# Patient Record
Sex: FEMALE | Race: OTHER
[De-identification: names, ages, dates, MRNs, and addresses within clinical notes are randomized per-mention and may not be internally consistent; named-entity substitution may affect disease eponyms.]

---

## 2020-03-30 ENCOUNTER — HOSPITAL ENCOUNTER (INPATIENT)
Dept: HOSPITAL 25 - MCHOBOUT | Age: 35
LOS: 5 days | Discharge: HOME | DRG: 540 | End: 2020-04-04
Attending: ADVANCED PRACTICE MIDWIFE | Admitting: ADVANCED PRACTICE MIDWIFE
Payer: MEDICAID

## 2020-03-30 DIAGNOSIS — D64.9: ICD-10-CM

## 2020-03-30 DIAGNOSIS — Z3A.41: ICD-10-CM

## 2020-03-30 DIAGNOSIS — O48.0: Primary | ICD-10-CM

## 2020-03-30 PROCEDURE — 86901 BLOOD TYPING SEROLOGIC RH(D): CPT

## 2020-03-30 PROCEDURE — 86900 BLOOD TYPING SEROLOGIC ABO: CPT

## 2020-03-30 PROCEDURE — 85025 COMPLETE CBC W/AUTO DIFF WBC: CPT

## 2020-03-30 PROCEDURE — 36415 COLL VENOUS BLD VENIPUNCTURE: CPT

## 2020-03-30 PROCEDURE — 80307 DRUG TEST PRSMV CHEM ANLYZR: CPT

## 2020-03-30 PROCEDURE — 86850 RBC ANTIBODY SCREEN: CPT

## 2020-03-30 PROCEDURE — G0480 DRUG TEST DEF 1-7 CLASSES: HCPCS

## 2020-03-30 NOTE — PN
Progress Note





- Progress Note


Date of Service: 03/30/20


Note: 





S: Pt resting comfortably in bed. Denies feeling any strong UCs. Describes 

active FM. Wondering what the next step is





O: /70  HR 76  RR 17  T 97.8


FHT 135bpm. Moderate variability. +Accels. No decels


UCs q 2-5, mild to palpation


VE deferred





A: IUP at 41 weeks here for postdates ripening/induction


No evidence of fetal metabolic acidemia





P: Given that pt just beginning to have UCs and Cat I FHT will continue 

Cervidil until 0138 (16 hours post placement). Pt and FOB agree. Enc rest.

## 2020-03-30 NOTE — HP
General Information





- Reason for Visit





IUP at 41 weeks here for postdates ripening/induction





- General Information


Maternal Age: 34


Grav: 1


Para: 0


SAB: 0


IEA: 0





Estimated Due Date: 20


Determined By: LMP


Gestational Age in Weeks/Days: 41-0/7


Maternal Blood Type and Rh: AB Positive





- Results this Pregnancy


Serology/RPR Result: Non-Reactive


Rubella Result: Immune


HBsAg Result: Negative


HIV Result: Negative


GBS Culture Result: Positive





Past Medical History


Delivery History: See  Records


Delivery History Comment: 





Primip


Pertinent Past Medical History: See  Records


Past Medical History Comment: 





H/O HSV. Has been on prophylactic Valtrex. Denies any outbreaks during pregnancy


Pertinent Past Surgical History: None


Pertinent Family History: See  Records


Family History Comment: 





Father: , colon cancer


PGM: , MVA


MGM: , esophageal cancer





- Antepartal Records


Antepartal Records: Reviewed, Pregnancy Complicated by: - GBS bacteriuria





Review of Systems


Constitutional: Comfortable


CV Complaint: No


Respiratory: Shortness of Breath: No


Gastrointestinal: No Nausea/Vomiting, Normal Bowel Movement


Genitourinary: No Dysuria, No Bleeding, No Leaking Fluid


Musculoskeletal: No Complaint, No Epigastric Pain


Neurological: No Headache, No Visual Changes


Fetal Movement: Normal





Exam


Allergies/Adverse Reactions: 


Allergies





No Known Allergies Allergy (Verified 20 08:35)


 











/51  HR 77  RR 16  T 97.7  SpO2 100% on RA





- Measurements


Height: 5 ft 2.25 in


Weight: 168 lb


Weight in lbs: [id number]


Body Mass Index (BMI): 30.4


Pre-Pregnancy Weight: 147 lb


Weight Gained This Pregnancy: 21 lbs and 0 ozs





- Exam


Breast: Breast Exam Deferred


CVA: No CVA Tenderness


Extremities: No Edema


Heart: Normal Rhythm/Heart Sounds


HEENT: No Significant Findings


Lungs: Clear Bilaterally


Rectal: Rectal Exam Deferred


Reflexes: DTR 2+


Thyroid: No Thyromegaly





- Abdominal Exam


Abdomen Exam: Non-Tender, Fundal Height Consistent with Dates





Targeted Exam Findings


See L&D Outpatient Visit Provider Note for Findings: N/A


Estimated Fetal Weight: EFW 7.5-8lbs by Leopolds


Cervical Exam: Closed


Effacement: Thick


Station: -1


Presenting Part: Vertex


Membrane Status: Intact


Sterile Speculum Exam: Not done


Bleeding/Discharge: None





EFM Findings





- External Fetal Monitor Findings


Baseline Fetal Heart Rate: 135


External Fetal Monitor Findings: Accelerations Present, No Pattern of Variable 

or Late Decelerations, Variability Moderate, Baseline Stable


External Monitor Findings Comment: No evidence of fetal metabolic acidemia


Contractions: Irregular, Mild


Contraction Frequency: q 6-8 min





Assessment/Plan





- Assessment





IUP at 41 weeks here for postdates ripening


Bishops Score: 4, unfavorable for induction


No evidence of fetal metabolic acidemia





- Obstetrical Risk Factors


Obstetrical Risk Factors: GBS Positive, Post-Dates





- Plan


Plan: Cervical Ripening, Admit - Anticipate Vaginal Delivery


Plan Comment: 





PARQ Cervidil. Pt and FOB agree. Monitor per protocol. Remove in 12-16 hours, 

sooner PRN onset active labor, fetal intolerance, or tachysystole





- Date/Time of Admission


Date of Admission: 20


Time of Admission: 08:32

## 2020-03-30 NOTE — PTEDU
Patient Name: BARB BARBER

MRN: [id number]



BARB BARBER selected video: Never Ever Shake a Baby to view on 03/30/2020 at 3:36
:39 PM from [id number]

## 2020-03-31 LAB
BASOPHILS # BLD AUTO: 0 10^3/UL (ref 0–0.2)
EOSINOPHIL # BLD AUTO: 0.1 10^3/UL (ref 0–0.6)
HCT VFR BLD AUTO: 31 % (ref 35–47)
HGB BLD-MCNC: 10.5 G/DL (ref 12–16)
LYMPHOCYTES # BLD AUTO: 1.8 10^3/UL (ref 1–4.8)
MCH RBC QN AUTO: 28 PG (ref 27–31)
MCHC RBC AUTO-ENTMCNC: 34 G/DL (ref 31–36)
MCV RBC AUTO: 82 FL (ref 80–97)
MONOCYTES # BLD AUTO: 0.4 10^3/UL (ref 0–0.8)
NEUTROPHILS # BLD AUTO: 4 10^3/UL (ref 1.5–7.7)
NRBC # BLD AUTO: 0 10^3/UL
NRBC BLD QL AUTO: 0.1
PLATELET # BLD AUTO: 147 10^3/UL (ref 150–450)
RBC # BLD AUTO: 3.8 10^6 /UL (ref 3.7–4.87)
WBC # BLD AUTO: 6.3 10^3/UL (ref 3.5–10.8)

## 2020-03-31 RX ADMIN — SODIUM CHLORIDE, SODIUM LACTATE, POTASSIUM CHLORIDE, AND CALCIUM CHLORIDE ONE MLS/HR: 600; 310; 30; 20 INJECTION, SOLUTION INTRAVENOUS at 08:17

## 2020-03-31 RX ADMIN — SODIUM CHLORIDE, SODIUM LACTATE, POTASSIUM CHLORIDE, AND CALCIUM CHLORIDE ONE: 600; 310; 30; 20 INJECTION, SOLUTION INTRAVENOUS at 08:25

## 2020-03-31 NOTE — PN
Progress Note





- Progress Note


Date of Service: 03/31/20


Note: 





S: Patient in significantly more pain with contractions





O: VE by RN 4cm/80/-1


Spontaneous rupture of membranes to clear fluid, Cook's removed


, minimal yvon, +accels, no decels


Pit @ 14


UCs q 1-3 min





A: IUP @ 41+1 in early labor


Doubt fetal acidemia








P: Plan to turn off pitocin and patient will get in tub for a time to get a 

break. May opt for return to pitocin vs rest vs therapeutic rest after tub.

## 2020-03-31 NOTE — PN
Progress Note





- Progress Note


Date of Service: 03/31/20


Note: 





S: Patient doing well with Cook's balloon, has been filled to 60/60ml. She was 

on birth ball and then bed. Reports she sleep a little bit.





O: VE deferred


, +accels, no decels, mod yvon


UCs q 5-8 min


VSS





A: IUP @ 41+1 weeks gestation for induction


GBS+


No evidence fetal acidemia





P: Discussion of pitocin for augmentation; patients in agreement. Will initiate 

low dose pitocin and increase saline in Cook's balloons to max if tolerated. 

Initiate GBS prophylaxis with active labor pattern.

## 2020-03-31 NOTE — PN
Progress Note





- Progress Note


Date of Service: 03/31/20


Note: 





S: Pt reports increased pain and pressure in the last 30 minutes. Wincing and 

breathing with UCs





O: /70  HR 76  T 98.3  RR 16


FHT 140bpm. Moderate variability. +Accels. No decels


UCs q 1.5-4 min, palpate mild


VE: Cervidil removed 1cm/50%/vtx -1





A: IUP at 41-1/7 s/p cervidil x 1 for postdates ripening/induction


No evidence of fetal metabolic acidemia





P: Will continue to monitor UCs at this time. Discussed trial of medication to 

aid with sleep such as Benadryl. Pt hoping to avoid medicine but will consider. 

PRN order input. Enc rest. If increasing discomfort or inability to sleep could 

try warm tub or shower. Re-eval in AM or sooner PRN

## 2020-03-31 NOTE — PN
Progress Note





- Progress Note


Date of Service: 03/31/20


Note: 





S: Coping well though getting tired. Contractions getting stronger.





O: VE deferred


, + accels, no decels, mod yvon


Pit @ 13


VSS, afebrile


UCs q 2-4 





A: IUP @ 41+1 weeks gestation for induction of labor


No evidence fetal acidemia


Intact membranes





P: Plan to remove balloon catheter @ 12 hour shakeel and assess cervical change. 

Then to decide between continued pitocin vs a rest for some sleep overnight.

## 2020-03-31 NOTE — PN
Progress Note





- Progress Note


Date of Service: 03/31/20


Note: 





S: Pt was able to rest some overnight without Benadryl. Reports in the last 

hour UCs feel stronger and closer together. 





O: /59  HR 75  T 98.4  RR 18


FHT 125bpm. Moderate variability. +Accels. No decels.


UCs q 1-3


VE unchanged since 0130: 1/50%/vtx -1





A: IUP at 41-1/7 in latent labor


No evidence of fetal metabolic acidemia





P: Pt will eat breakfast, take a shower, and agrees to IV Placement. If UC 

pattern continues will likely trial IV pitocin. Report to Christie Ervin CNM 

who will assume care at 0800

## 2020-03-31 NOTE — PN
Progress Note





- Progress Note


Date of Service: 03/31/20


Note: 





S: Patient reports she slept "a few hours" and contractions this morning are 

mild. She declined translation via iPad and states that her  can 

translate for her. 





O: VE 1cm/60/-1


UCs q 1-4 min, some coupling


, +accels, no decels, mod yvon


VSS, afebrile





A: IUP 41+1 week gestation for induction of labor


No evidence fetal acidemia


GBS bacteriuria


Membranes intact





P: PARQ discussion of Cook's catheter for ripening and considering pitocin 

given contraction pattern. Patient in agreement.

## 2020-04-01 PROCEDURE — 3E033VJ INTRODUCTION OF OTHER HORMONE INTO PERIPHERAL VEIN, PERCUTANEOUS APPROACH: ICD-10-PCS | Performed by: OBSTETRICS & GYNECOLOGY

## 2020-04-01 PROCEDURE — 3E0P7VZ INTRODUCTION OF HORMONE INTO FEMALE REPRODUCTIVE, VIA NATURAL OR ARTIFICIAL OPENING: ICD-10-PCS | Performed by: OBSTETRICS & GYNECOLOGY

## 2020-04-01 RX ADMIN — PENICILLIN G POTASSIUM SCH MLS/HR: 20000000 POWDER, FOR SOLUTION INTRAVENOUS at 16:03

## 2020-04-01 RX ADMIN — PENICILLIN G POTASSIUM SCH MLS/HR: 20000000 POWDER, FOR SOLUTION INTRAVENOUS at 11:47

## 2020-04-01 RX ADMIN — PENICILLIN G POTASSIUM SCH MLS/HR: 20000000 POWDER, FOR SOLUTION INTRAVENOUS at 07:48

## 2020-04-01 RX ADMIN — PENICILLIN G POTASSIUM SCH MLS/HR: 20000000 POWDER, FOR SOLUTION INTRAVENOUS at 03:46

## 2020-04-01 RX ADMIN — PENICILLIN G POTASSIUM SCH MLS/HR: 20000000 POWDER, FOR SOLUTION INTRAVENOUS at 20:05

## 2020-04-01 NOTE — PN
Progress Note





- Progress Note


Date of Service: 04/01/20


SOAP: 


Subjective:


Pt remains fairly comfortable with epidural. 





Objective:


Cervix: 9cm/ 100%/0 station


FHR: Baseline 150/ minimal variability/ no accels/ no decels


UCs: 2-3 min apart


BP: 104/49


Temp: 98.6


Fluid clear





Assessment:


Pt with good change. FHR tracing Category II. 





Plan:


Will continue to labor. Position changes, IVF bolus, and decrease Pitocin if 

necessary to try to improve FHR tracing. Recheck cervix in 2 hours or as 

needed. Continue GBS prophylaxis.

## 2020-04-01 NOTE — PN
Progress Note





- Progress Note


Date of Service: 20


SOAP: 


Subjective:


Pt comfortable with epidural.  at bedside. 





Objective:


FHR: Baseline 140/ moderate variability/ no accels/ was having some late decels 

which seem to have resolved with position change


UCs: 2-3 minutes


Pitocin at 14 mu/ min


Cervical exam deferred


Temp: 98.7


BP: 117/75





Assessment:


No evidence of fetal acidemia or chorioamnionitis. Comfortable with epidural. 





Plan:


Will check cervix in 1-2 hours or when pt feeling more pressure. Continue 

Pitocin. Continue GBS prophylaxis. Anticipate .

## 2020-04-01 NOTE — PN
Progress Note





- Progress Note


Date of Service: 04/01/20


SOAP: 


Subjective:


Pt remains comfortable with epidural.


 at bedside.





Objective:


Cervix: 7cm/ 0 station/ 100%/ vtx


FHR: Baseline 140/ moderate variability/ + accels/ no decels


UCs: 2-3 minutes, MVUs 180 


BP: 109/56


Temp: 99.3  





Assessment:


Pt made some cervical change. No evidence of fetal acidemia or 

chorioamnionitis. 





Plan:


Continue Pitocin. Continue GBS prophylaxis. Continue position changes. Recheck 

cervix in 2 hours.

## 2020-04-01 NOTE — PN
Progress Note





- Progress Note


Date of Service: 04/01/20


SOAP: 


Subjective:


Pt was very uncomfortable with ctx, despite epidural. Using bolus button 

occasionally.





Objective:


Cervix: 6 cm/ 100%/ 0 station, fetal head better applied, still molding but no 

caput


UCs: were every 2 minutes prior to reducing Pitocin, now 2-3, 155 MVUs


FHR:Baseline 155 w/minimal variability and recurrent late decels, following 

position change and reduction of Pitocin now moderate variability and no decels


Temp: 99.3  





Assessment:


Baby feels better positioned in the pelvis, but no change in dilation yet. FHR 

tracing Category II but improves to Category I with position changes and 

reduction of Pitocin. 





Plan:


Pitocin reduced by half, will begin to increase again as tolerated according to 

FHR. Anesthesiologist Dr. Rao called to bolus epidural. Will recheck cervix 

in an hour or two or as needed.

## 2020-04-01 NOTE — PN
Progress Note





- Progress Note


Date of Service: 04/01/20


SOAP: 


Subjective:


Pt more uncomfortable with ctx. Encouraged use of bolus button.


 at bedside.





Objective:


Cervix: 6cm/ -1/ 100%/ vtx with molding noted but no caput


FHR: Baseline 145/ moderate variability/ no accels/ no decels


UCs: Q 2 minutes/ MVUs 190


Pitocin at 16 mu/ min


Temp: 99.1


Fluid clear 





Assessment:


Pt has not made cervical change in a number of hours. Last exam by RN at 0639 

was 8cm, so likely no change for at least 4 hours. No evidence of fetal 

acidemia or chorioamnionitis. 





Plan:


IUPC placed. Will titrate Pitocin accordingly. Recommend position frequent 

position changes to encourage fetal descent and optimal positioning. Recheck in 

2 hours.

## 2020-04-01 NOTE — PN
Progress Note





- Progress Note


Date of Service: 20


Note: 


Pt admitted about 50+ hours ago for induction at 41 weeks EGA.


Pt initially had cervical ripening, followed by pitocin.


AROM was about 24 hrs ago.  She received an epidural at about 0130 last night.


During the day today, she progressed from about 6cm to 9cm over about 9 hours, 

and there has been minimal change for about 4 hours.


Pt offered to continue labor vs discontinue and have .


Pt is from [place], seems to understand English quite well.   also present.

  Both declined translation service.


After discussion, pt and  have decided to proceed with a  

delivery.  


We discussed the surgery as well as the risks including bleeding, hemorrhage, 

infection, organ injury, and transfusion.


All questions answered, consent signed.

## 2020-04-02 LAB
BASOPHILS # BLD AUTO: 0 10^3/UL (ref 0–0.2)
EOSINOPHIL # BLD AUTO: 0 10^3/UL (ref 0–0.6)
HCT VFR BLD AUTO: 24 % (ref 35–47)
HGB BLD-MCNC: 8.2 G/DL (ref 12–16)
LYMPHOCYTES # BLD AUTO: 0.7 10^3/UL (ref 1–4.8)
MCH RBC QN AUTO: 28 PG (ref 27–31)
MCHC RBC AUTO-ENTMCNC: 34 G/DL (ref 31–36)
MCV RBC AUTO: 82 FL (ref 80–97)
MONOCYTES # BLD AUTO: 0.3 10^3/UL (ref 0–0.8)
NEUTROPHILS # BLD AUTO: 12.4 10^3/UL (ref 1.5–7.7)
NRBC # BLD AUTO: 0 10^3/UL
NRBC BLD QL AUTO: 0
PLATELET # BLD AUTO: 111 10^3/UL (ref 150–450)
RBC # BLD AUTO: 2.96 10^6 /UL (ref 3.7–4.87)
WBC # BLD AUTO: 13.4 10^3/UL (ref 3.5–10.8)

## 2020-04-02 RX ADMIN — Medication SCH MG: at 20:45

## 2020-04-02 RX ADMIN — DOCUSATE SODIUM SCH MG: 100 CAPSULE, LIQUID FILLED ORAL at 14:18

## 2020-04-02 RX ADMIN — KETOROLAC TROMETHAMINE SCH MG: 30 INJECTION, SOLUTION INTRAMUSCULAR; INTRAVENOUS at 00:20

## 2020-04-02 RX ADMIN — DOCUSATE SODIUM SCH MG: 100 CAPSULE, LIQUID FILLED ORAL at 08:42

## 2020-04-02 RX ADMIN — SIMETHICONE CHEW TAB 80 MG SCH MG: 80 TABLET ORAL at 18:31

## 2020-04-02 RX ADMIN — SIMETHICONE CHEW TAB 80 MG SCH MG: 80 TABLET ORAL at 12:32

## 2020-04-02 RX ADMIN — SIMETHICONE CHEW TAB 80 MG SCH MG: 80 TABLET ORAL at 08:42

## 2020-04-02 RX ADMIN — KETOROLAC TROMETHAMINE SCH MG: 30 INJECTION, SOLUTION INTRAMUSCULAR; INTRAVENOUS at 08:40

## 2020-04-02 RX ADMIN — KETOROLAC TROMETHAMINE SCH MG: 30 INJECTION, SOLUTION INTRAMUSCULAR; INTRAVENOUS at 14:16

## 2020-04-02 RX ADMIN — ACETAMINOPHEN SCH MG: 325 TABLET ORAL at 08:41

## 2020-04-02 RX ADMIN — DOCUSATE SODIUM SCH MG: 100 CAPSULE, LIQUID FILLED ORAL at 20:45

## 2020-04-02 NOTE — OP
DATE OF OPERATION:  Surgery was started on 20 - ROOM #104

 

DATE OF BIRTH:  [birth year]

 

SURGEON:  Lio Palacios MD

 

ASSISTANT:  Christianne Roman CNM

 

ANESTHESIOLOGIST:  Dr. Carito Vazquez.

 

ANESTHESIA:  Epidural.

 

PRE-OP DIAGNOSIS:  41 weeks gestation with arrest of dilation.

 

POST-OP DIAGNOSIS:  41 weeks gestation with arrest of dilation.

 

OPERATIVE PROCEDURE:  Primary low transverse  section.

 

ESTIMATED BLOOD LOSS:  800 cc.

 

URINE OUTPUT:  120 cc.

 

IV FLUIDS:  2500 cc lactated Ringer's.

 

MATERIALS TO LAB:  Cord blood.

 

INDICATIONS:  This patient is a 34-year-old  1, para 0, who was admitted 
2 days ago for induction of labor at 41 weeks gestation.  The patient started 
with cervical ripening and proceeded to Pitocin.  She received an epidural 
about 20 hours earlier.  The patient had slow change from about 6 cm to 9 cm 
throughout the day and had no significant cervical change from 9 cm after 
several hours.  Considering this, the patient was counseled regarding our 
options, and she desired to proceed with the  section at that point.  
She was extensively counseled and consent was signed.

 

FINDINGS:  Normal appearing fallopian tubes and ovaries.  Uterus appeared 
somewhat pale and edematous.  Delivery was productive of a male infant weighing 
7 pounds 15 ounces with Apgars of 9 and 9.  Time of delivery was 2320.  Of note
, there was a small left lower uterine segment extension of the incision.

 

COMPLICATIONS:  None.

 

DESCRIPTION OF PROCEDURE:  The risks, benefits, and alternatives were described 
to the patient and informed consent was obtained.  The patient was taken to the 
operating room with IV running where epidural anesthesia was induced and found 
to be adequate.  The patient was prepped and draped in the normal sterile 
fashion in the dorsal supine position with leftward tilt.  A Pfannenstiel skin 
incision was made with a scalpel and this was carried down to the underlying 
fascia sharply. The fascia was then scored in the midline with the scalpel.  
The incision was extended using Pearson scissors.  The rectus muscles were 
dissected off the rectus fascia using blunt and sharp dissection.  The rectus 
muscles were  in the midline bluntly.  The peritoneum was also entered 
bluntly.  A bladder blade was placed.  

A bladder flap was created sharply using Metzenbaum scissors.  A low transverse 
uterine incision was made with the scalpel.  This was carried down to the 
amniotic cavity which was productive of clear fluid.  The incision was extended 
with blunt traction.  The fetal head was elevated to the level of the incision 
without difficulty and delivered through the incision.  With fundal pressure, 
the shoulders and body delivered without difficulty.  The infant had an 
excellent tone and cried immediately on delivery.  The cord was doubly clamped 
and cut.  The infant was then handed to the awaiting pediatrician.

 

Cord blood was collected.  The placenta then delivered with manual extraction.  
The uterus was then exteriorized and cleared of all clots and debris.  Uterine 
incision was reapproximated using 0 Polysorb in a running-locked fashion.  A 
second layer of imbricating sutures of 0 Polysorb was also placed with good 
hemostasis.  The posterior cul-de-sac was irrigated with saline.  The uterus 
was then returned to the abdomen, and the incision was reinspected and noted to 
be hemostatic.  The peritoneum was closed with 2-0 chromic in a running 
fashion.  The fascia was closed with 0 Polysorb in a running fashion.  
Subcutaneous tissues were reapproximated using 2-0 chromic and interrupted 
sutures.  The skin was then closed with 4-0 Monocryl in a subcuticular stitch.  
Mastisol and Steri-Strips were placed over the incision which was then covered 
with a sterile bandage.

 

The patient tolerated the procedure well.  Sponge, lap, and needle counts were 
correct x2.

 

 [id number]/[facility] #: [id number]

[facility]

## 2020-04-03 RX ADMIN — Medication SCH MG: at 21:09

## 2020-04-03 RX ADMIN — IBUPROFEN SCH MG: 600 TABLET, FILM COATED ORAL at 01:31

## 2020-04-03 RX ADMIN — OXYCODONE HYDROCHLORIDE PRN MG: 5 CAPSULE ORAL at 08:47

## 2020-04-03 RX ADMIN — OXYCODONE HYDROCHLORIDE PRN MG: 5 CAPSULE ORAL at 14:06

## 2020-04-03 RX ADMIN — DOCUSATE SODIUM SCH MG: 100 CAPSULE, LIQUID FILLED ORAL at 21:06

## 2020-04-03 RX ADMIN — Medication SCH MG: at 08:47

## 2020-04-03 RX ADMIN — SIMETHICONE CHEW TAB 80 MG SCH: 80 TABLET ORAL at 08:48

## 2020-04-03 RX ADMIN — ACETAMINOPHEN SCH: 325 TABLET ORAL at 10:59

## 2020-04-03 RX ADMIN — SIMETHICONE CHEW TAB 80 MG SCH MG: 80 TABLET ORAL at 14:05

## 2020-04-03 RX ADMIN — IBUPROFEN SCH MG: 600 TABLET, FILM COATED ORAL at 21:05

## 2020-04-03 RX ADMIN — SIMETHICONE CHEW TAB 80 MG SCH MG: 80 TABLET ORAL at 20:00

## 2020-04-03 RX ADMIN — DOCUSATE SODIUM SCH MG: 100 CAPSULE, LIQUID FILLED ORAL at 14:06

## 2020-04-03 RX ADMIN — IBUPROFEN SCH MG: 600 TABLET, FILM COATED ORAL at 08:47

## 2020-04-03 RX ADMIN — SIMETHICONE CHEW TAB 80 MG SCH: 80 TABLET ORAL at 09:18

## 2020-04-03 RX ADMIN — KETOROLAC TROMETHAMINE SCH: 30 INJECTION, SOLUTION INTRAMUSCULAR; INTRAVENOUS at 09:18

## 2020-04-03 RX ADMIN — ACETAMINOPHEN SCH: 325 TABLET ORAL at 09:16

## 2020-04-03 RX ADMIN — DOCUSATE SODIUM SCH MG: 100 CAPSULE, LIQUID FILLED ORAL at 08:47

## 2020-04-03 RX ADMIN — IBUPROFEN SCH: 600 TABLET, FILM COATED ORAL at 16:06

## 2020-04-03 NOTE — PTEDU
Patient Name: BARB BARBER

MRN: [id number]



BARB BARBER selected video: BBOB: Nurturing Your Gorgeous &Growing Baby by Breast
feeding to view on 04/03/2020 at 11:23:28 AM from [id number]

## 2020-04-04 VITALS — SYSTOLIC BLOOD PRESSURE: 109 MMHG | DIASTOLIC BLOOD PRESSURE: 67 MMHG

## 2020-04-04 RX ADMIN — IBUPROFEN SCH MG: 600 TABLET, FILM COATED ORAL at 06:40

## 2020-04-04 RX ADMIN — SIMETHICONE CHEW TAB 80 MG SCH: 80 TABLET ORAL at 07:17

## 2020-04-04 RX ADMIN — FENTANYL CITRATE SCH: 50 INJECTION, SOLUTION INTRAMUSCULAR; INTRAVENOUS at 07:16

## 2020-04-04 RX ADMIN — IBUPROFEN SCH MG: 600 TABLET, FILM COATED ORAL at 12:29

## 2020-04-04 RX ADMIN — Medication SCH MG: at 08:39

## 2020-04-04 RX ADMIN — PENICILLIN G POTASSIUM SCH: 20000000 POWDER, FOR SOLUTION INTRAVENOUS at 07:16

## 2020-04-04 RX ADMIN — IBUPROFEN SCH: 600 TABLET, FILM COATED ORAL at 08:28

## 2020-04-04 RX ADMIN — DOCUSATE SODIUM SCH MG: 100 CAPSULE, LIQUID FILLED ORAL at 08:39

## 2020-04-04 RX ADMIN — SIMETHICONE CHEW TAB 80 MG SCH MG: 80 TABLET ORAL at 08:39

## 2020-04-04 RX ADMIN — SIMETHICONE CHEW TAB 80 MG SCH MG: 80 TABLET ORAL at 12:29
